# Patient Record
Sex: FEMALE | URBAN - METROPOLITAN AREA
[De-identification: names, ages, dates, MRNs, and addresses within clinical notes are randomized per-mention and may not be internally consistent; named-entity substitution may affect disease eponyms.]

---

## 2019-06-03 ENCOUNTER — APPOINTMENT (RX ONLY)
Dept: URBAN - METROPOLITAN AREA CLINIC 352 | Facility: CLINIC | Age: 79
Setting detail: DERMATOLOGY
End: 2019-06-03

## 2019-06-03 DIAGNOSIS — L72.0 EPIDERMAL CYST: ICD-10-CM

## 2019-06-03 PROBLEM — I48.91 UNSPECIFIED ATRIAL FIBRILLATION: Status: ACTIVE | Noted: 2019-06-03

## 2019-06-03 PROBLEM — M12.9 ARTHROPATHY, UNSPECIFIED: Status: ACTIVE | Noted: 2019-06-03

## 2019-06-03 PROBLEM — I10 ESSENTIAL (PRIMARY) HYPERTENSION: Status: ACTIVE | Noted: 2019-06-03

## 2019-06-03 PROBLEM — R23.3 SPONTANEOUS ECCHYMOSES: Status: ACTIVE | Noted: 2019-06-03

## 2019-06-03 PROBLEM — E78.5 HYPERLIPIDEMIA, UNSPECIFIED: Status: ACTIVE | Noted: 2019-06-03

## 2019-06-03 PROBLEM — E03.9 HYPOTHYROIDISM, UNSPECIFIED: Status: ACTIVE | Noted: 2019-06-03

## 2019-06-03 PROCEDURE — ? INCISION AND DRAINAGE

## 2019-06-03 PROCEDURE — ? COUNSELING

## 2019-06-03 PROCEDURE — 10060 I&D ABSCESS SIMPLE/SINGLE: CPT

## 2019-06-03 ASSESSMENT — LOCATION ZONE DERM: LOCATION ZONE: FACE

## 2019-06-03 ASSESSMENT — LOCATION DETAILED DESCRIPTION DERM: LOCATION DETAILED: LEFT CENTRAL MALAR CHEEK

## 2019-06-03 ASSESSMENT — LOCATION SIMPLE DESCRIPTION DERM: LOCATION SIMPLE: LEFT CHEEK

## 2019-06-03 NOTE — PROCEDURE: INCISION AND DRAINAGE
Detail Level: Detailed
Render Postcare In Note?: No
Lesion Type: Abscess
Epidermal Sutures: 4-0 Ethilon
Post-Care Instructions: I reviewed with the patient in detail post-care instructions. Patient should keep wound covered and call the office should any redness, pain, swelling or worsening occur.
Preparation Text: The area was prepped in the usual clean fashion.
Method: 15 blade
Consent was obtained and risks were reviewed including but not limited to delayed wound healing, infection, need for multiple I and D's, and pain.
Epidermal Closure: simple interrupted
Wound Care: Petrolatum
Suture Text: The incision was partially closed with
Size Of Lesion In Cm (Optional But May Be Required For Some Insurances): 0
Anesthesia Type: 1% lidocaine with epinephrine
Dressing: dry sterile dressing
Curette Text (Optional): After the contents were expressed a curette was used to partially remove the cyst wall.

## 2019-10-18 ENCOUNTER — APPOINTMENT (RX ONLY)
Dept: URBAN - METROPOLITAN AREA CLINIC 352 | Facility: CLINIC | Age: 79
Setting detail: DERMATOLOGY
End: 2019-10-18

## 2019-10-18 DIAGNOSIS — L57.0 ACTINIC KERATOSIS: ICD-10-CM

## 2019-10-18 PROCEDURE — ? COUNSELING

## 2019-10-18 PROCEDURE — 17000 DESTRUCT PREMALG LESION: CPT

## 2019-10-18 PROCEDURE — ? LIQUID NITROGEN (CM)

## 2019-10-18 ASSESSMENT — LOCATION ZONE DERM
LOCATION ZONE: EAR
LOCATION ZONE: EAR

## 2019-10-18 ASSESSMENT — LOCATION DETAILED DESCRIPTION DERM
LOCATION DETAILED: LEFT SUPERIOR CRUS OF ANTIHELIX
LOCATION DETAILED: LEFT SUPERIOR CRUS OF ANTIHELIX

## 2019-10-18 ASSESSMENT — LOCATION SIMPLE DESCRIPTION DERM
LOCATION SIMPLE: LEFT EAR
LOCATION SIMPLE: LEFT EAR

## 2019-11-18 ENCOUNTER — APPOINTMENT (RX ONLY)
Dept: URBAN - METROPOLITAN AREA CLINIC 352 | Facility: CLINIC | Age: 79
Setting detail: DERMATOLOGY
End: 2019-11-18

## 2019-11-18 PROBLEM — D48.5 NEOPLASM OF UNCERTAIN BEHAVIOR OF SKIN: Status: ACTIVE | Noted: 2019-11-18

## 2019-11-18 PROCEDURE — ? BIOPSY BY SHAVE METHOD

## 2019-11-18 PROCEDURE — 69100 BIOPSY OF EXTERNAL EAR: CPT

## 2019-12-09 ENCOUNTER — APPOINTMENT (RX ONLY)
Dept: URBAN - METROPOLITAN AREA CLINIC 84 | Facility: CLINIC | Age: 79
Setting detail: DERMATOLOGY
End: 2019-12-09

## 2019-12-09 VITALS — HEART RATE: 66 BPM | DIASTOLIC BLOOD PRESSURE: 78 MMHG | SYSTOLIC BLOOD PRESSURE: 134 MMHG

## 2019-12-09 PROBLEM — C44.229 SQUAMOUS CELL CARCINOMA OF SKIN OF LEFT EAR AND EXTERNAL AURICULAR CANAL: Status: ACTIVE | Noted: 2019-12-09

## 2019-12-09 PROCEDURE — ? CONSULTATION FOR SURGICAL REPAIR

## 2019-12-09 PROCEDURE — 17311 MOHS 1 STAGE H/N/HF/G: CPT

## 2019-12-09 PROCEDURE — 15260 FTH/GFT FR N/E/E/L 20 SQCM/<: CPT

## 2019-12-09 PROCEDURE — ? MOHS SURGERY

## 2019-12-09 PROCEDURE — ? REPAIR NOTE

## 2019-12-09 NOTE — PROCEDURE: CONSULTATION FOR SURGICAL REPAIR
Size Of Defect: 1
Repair Selected By Patient (Optional): Second intention
Referring Provider (Optional): 
Detail Level: Detailed
Date Scheduled For Repair (Optional): follow up in 3 weeks
X Size Of Defect In Cm (Optional): 0.9

## 2019-12-09 NOTE — PROCEDURE: MOHS SURGERY
Referred To Plastics For Closure Text (Leave Blank If You Do Not Want): After obtaining clear surgical margins the patient was sent to plastics for surgical repair.  The patient understands they will receive post-surgical care and follow-up from Dr. Pink.

## 2019-12-09 NOTE — PROCEDURE: MOHS SURGERY
Cataracts    CARE AFTER SMALL INCISION TECHNIQUE       Dr. Mansfield, Dr. Gibson,  Dr. Garcia and Dr. Matamoros perform state-of-the-art cataract surgery.  In most cases, you will have the new small incision technique performed.  However, because of technical factors and the anatomy of your eye, sometimes it is better to do the more traditional extracapsular technique.  Your doctor can explain this technique to your.  The routine before surgery is the same for both techniques.   However, your instructions and restrictions may be slightly different after surgery.      · You may sleep on either side.  (Remember to wear your eye shield while you sleep for 1 week.)    · Do not swim for 1 week.    · You may resume all other normal activities immediately, or as soon as you feel able.    · Yes!  You can lift anything, bend, bowl, golf, jog, houseclean, mow the lawn, read and watch TV.    EYE DROPS:    Follow your physician's instructions regarding your post-operative eye drops.    Other Instructions:  Bring your eye drops to all your appointments.    If you have any questions or concerns, your doctor would prefer you call rather than worry needlessly.      We are available 24 hours a day by calling [phone].    [address], [address]  [place] [address] [phone]         What to Expect After Cataract Surgery    The following instructions are designed to help prevent injury to your eye after surgery, to help decrease pressure in your eye, and to prevent infection or complications.    Care of your operated eye  · Do not rub your eye; gently wipe the eyelid with a clean tissue.  · Tape your metal shield over your eye whenever you sleep or nap.  Your doctor will tell you when you no longer need it.  · Always wash your hands before and after putting medications into your eye and when applying the metal shield.  · You will be instructed about your eye medications by the nurse in [facility] or by  your doctor the following day in his office.  Take your eye medications and all supplies (if given) with you when you go to the doctor's office tomorrow.    What to expect after eye surgery  · You may have a sticking or scratching sensation in your eye for several days.  · Mild pain is sometimes present for 24 hours. (Plain Tylenol will usually relieve this pain.)  · Double vision is common in the first 24 hours.  · Vision may be blurry for several days to weeks.  This will clear with healing and your new glasses.  · Moderate to severe redness is common; this will disappear.  · Occasionally, the lower eye lid may be black and blue.  · Mild crusting on the eye lids may occur for one to seven days.  · Your facial muscles may feel weak and numb for 24 hours after surgery.  · You may notice some floaters (black spots in vision).  · Mild eye tearing is common for several weeks after surgery.  · You will be fitted with new eyeglasses seven to eight weeks after surgery.    Call your doctor if any of the following occur:  · Severe pain in the operated eye.  · Large amounts of continuous pus or mucous draining from the operated eye.  A little discharge in the morning is normal.  · Sudden decrease in vision or development of shadows or curtains over vision.    If you have any questions or concerns, your doctor would prefer you call the office rather than worry needlessly.     Care After Anesthesia or Sedation    After Discharge  · Due to the medicine given, someone must drive you home.  If possible, have someone stay with you at home the day of discharge and the night after surgery.  · If you have infants or small children at home, please have someone help you for at least 24 hours after your surgery.  · Do not drive for at least 24 hours after surgery (or as told by your doctor).  · Rest for the remainder of the day. Go up and down stairs slowly.  · Do not smoke after surgery.  Smoking can delay healing.  · Do not operate  heavy or potential harmful equipment.  · Do not make legally binding decisions.  · Do no drink alcohol for 24 hours.    Diet  · Nausea may be expected for the first 24 to 48 hours.  Start eating a bland diet (toast, gelatin, 7-up, hot cereal, crackers, sherbet, broth, soup).  · Drink plenty of fluids (6 to 8 glasses of water).  · Resume your regular diet as able.  · Avoid greasy or spicy foods for 24 hours.    Urination  · The effects of anesthetics may cause some people to have trouble passing urine the day of surgery.  Drink a lot of fluids to help prevent this.  · Try to urinate within 12 hours of surgery.  · If you are unable to pass urine and feel like you need to, call your doctor or the hospital.    Pain Control  · If your incision was injected with a long acting local anesthetic, it will wear off in 4 to 6 hours.  You can expect to have some pain at this time.  · Treat your pain with the prescribed pain medicine before it wears off.  Do not wait until your pain becomes severe.    · Ask your nurse when you had your last pain medicine, so you know when you can take another one after you get home.    · Your last pain medicine was given at [facility]IV[facility]    Call your doctor if you have:    · Nausea and vomiting that does not stop  · Fever over 101 degrees F.  · Pain not relieved by pain medication  · If you feel you have to pass urine and you are not able to do so.  · Unusual changes in behavior  · Dizziness  · Hives     If you are not able to reach your doctor, you may call the emergency department.     Graft Cartilage Fenestration Text: The cartilage was fenestrated with a 2mm punch biopsy to help facilitate graft survival and healing.

## 2019-12-09 NOTE — PROCEDURE: MOHS SURGERY
Referred To Mid-Level For Closure Text (Leave Blank If You Do Not Want): After obtaining clear surgical margins the patient was sent to Mundo Pierre PA-C for surgical repair.  The patient understands they will receive post-surgical care and follow-up from the mid-level provider.

## 2019-12-16 ENCOUNTER — APPOINTMENT (RX ONLY)
Dept: URBAN - METROPOLITAN AREA CLINIC 84 | Facility: CLINIC | Age: 79
Setting detail: DERMATOLOGY
End: 2019-12-16

## 2019-12-16 DIAGNOSIS — Z48.817 ENCOUNTER FOR SURGICAL AFTERCARE FOLLOWING SURGERY ON THE SKIN AND SUBCUTANEOUS TISSUE: ICD-10-CM

## 2019-12-16 PROBLEM — C44.92 SQUAMOUS CELL CARCINOMA OF SKIN, UNSPECIFIED: Status: ACTIVE | Noted: 2019-12-16

## 2019-12-16 PROCEDURE — ? CONSULTATION FOR SURGICAL REPAIR

## 2019-12-16 PROCEDURE — 99212 OFFICE O/P EST SF 10 MIN: CPT | Mod: 24

## 2019-12-16 PROCEDURE — ? POST-OP WOUND EVALUATION

## 2019-12-16 PROCEDURE — 99024 POSTOP FOLLOW-UP VISIT: CPT

## 2019-12-16 ASSESSMENT — LOCATION SIMPLE DESCRIPTION DERM: LOCATION SIMPLE: LEFT EAR

## 2019-12-16 ASSESSMENT — LOCATION DETAILED DESCRIPTION DERM: LOCATION DETAILED: LEFT INFERIOR CRUS OF ANTIHELIX

## 2019-12-16 ASSESSMENT — LOCATION ZONE DERM: LOCATION ZONE: EAR

## 2019-12-16 NOTE — PROCEDURE: POST-OP WOUND EVALUATION
Follow Up Units (Optional): 2
Wound Color?: pink
Follow Up Time Frame (Optional): weeks
Add 98112 Cpt? (Important Note: In 2017 The Use Of 38176 Is Being Tracked By Cms To Determine Future Global Period Reimbursement For Global Periods): yes
Wound Diameter In Cm(Optional): 0
Wound Crusting?: clean
Wound Evaluated By (Optional): [email or web address]
Wound Edema?: mild
Detail Level: Detailed

## 2019-12-16 NOTE — PROCEDURE: CONSULTATION FOR SURGICAL REPAIR
Size Of Defect: 1
Date Scheduled For Repair (Optional): follow up in 3 weeks
X Size Of Defect In Cm (Optional): 0.9
Repair Selected By Patient (Optional): Second intention
Referring Provider (Optional): 
Detail Level: Detailed

## 2022-04-20 ENCOUNTER — APPOINTMENT (RX ONLY)
Dept: URBAN - METROPOLITAN AREA CLINIC 78 | Facility: CLINIC | Age: 82
Setting detail: DERMATOLOGY
End: 2022-04-20

## 2022-04-20 DIAGNOSIS — L82.0 INFLAMED SEBORRHEIC KERATOSIS: ICD-10-CM

## 2022-04-20 DIAGNOSIS — D18.0 HEMANGIOMA: ICD-10-CM

## 2022-04-20 DIAGNOSIS — L82.1 OTHER SEBORRHEIC KERATOSIS: ICD-10-CM

## 2022-04-20 DIAGNOSIS — D22 MELANOCYTIC NEVI: ICD-10-CM

## 2022-04-20 DIAGNOSIS — Z12.83 ENCOUNTER FOR SCREENING FOR MALIGNANT NEOPLASM OF SKIN: ICD-10-CM

## 2022-04-20 DIAGNOSIS — L57.8 OTHER SKIN CHANGES DUE TO CHRONIC EXPOSURE TO NONIONIZING RADIATION: ICD-10-CM

## 2022-04-20 DIAGNOSIS — L81.4 OTHER MELANIN HYPERPIGMENTATION: ICD-10-CM

## 2022-04-20 PROBLEM — D22.5 MELANOCYTIC NEVI OF TRUNK: Status: ACTIVE | Noted: 2022-04-20

## 2022-04-20 PROBLEM — D18.01 HEMANGIOMA OF SKIN AND SUBCUTANEOUS TISSUE: Status: ACTIVE | Noted: 2022-04-20

## 2022-04-20 PROCEDURE — 99213 OFFICE O/P EST LOW 20 MIN: CPT | Mod: 25

## 2022-04-20 PROCEDURE — ? ADDITIONAL NOTES

## 2022-04-20 PROCEDURE — ? COUNSELING

## 2022-04-20 PROCEDURE — ? TREATMENT REGIMEN

## 2022-04-20 PROCEDURE — ? LIQUID NITROGEN

## 2022-04-20 PROCEDURE — ? FULL BODY SKIN EXAM

## 2022-04-20 PROCEDURE — 17110 DESTRUCTION B9 LES UP TO 14: CPT

## 2022-04-20 ASSESSMENT — LOCATION ZONE DERM
LOCATION ZONE: NECK
LOCATION ZONE: TRUNK
LOCATION ZONE: FACE

## 2022-04-20 ASSESSMENT — LOCATION SIMPLE DESCRIPTION DERM
LOCATION SIMPLE: LEFT TEMPLE
LOCATION SIMPLE: LEFT LOWER BACK
LOCATION SIMPLE: LEFT UPPER BACK
LOCATION SIMPLE: UPPER BACK
LOCATION SIMPLE: RIGHT ANTERIOR NECK

## 2022-04-20 ASSESSMENT — LOCATION DETAILED DESCRIPTION DERM
LOCATION DETAILED: RIGHT INFERIOR LATERAL NECK
LOCATION DETAILED: LEFT MID-UPPER BACK
LOCATION DETAILED: LEFT SUPERIOR MEDIAL MIDBACK
LOCATION DETAILED: LEFT INFERIOR TEMPLE
LOCATION DETAILED: LEFT INFERIOR UPPER BACK
LOCATION DETAILED: INFERIOR THORACIC SPINE
LOCATION DETAILED: RIGHT INFERIOR ANTERIOR NECK

## 2022-04-20 NOTE — PROCEDURE: LIQUID NITROGEN
Medical Necessity Information: It is in your best interest to select a reason for this procedure from the list below. All of these items fulfill various CMS LCD requirements except the new and changing color options.
Add 52 Modifier (Optional): no
Consent: The patient's consent was obtained including but not limited to risks of crusting, scabbing, blistering, scarring, darker or lighter pigmentary change, recurrence, incomplete removal and infection.
Show Topical Anesthesia Variable?: Yes
Spray Paint Text: The liquid nitrogen was applied to the skin utilizing a spray paint frosting technique.
Post-Care Instructions: I reviewed with the patient in detail post-care instructions. Patient is to wear sunprotection, and avoid picking at any of the treated lesions. Pt may apply Vaseline to crusted or scabbing areas.
Medical Necessity Clause: This procedure was medically necessary because the lesions that were treated were:
Detail Level: Detailed

## 2023-12-22 NOTE — PROCEDURE: COUNSELING
The patient presented for insulin injection teaching.  The patient was able to demonstrate without assistance the use of the insulin pen, where to inject and what time to inject the insulin prior to leaving the office.  Hypoglycemia interventions were reviewed with the patient.  The patient and SO verbalized understanding of all prior to leaving the office. Provider present in the clinic.     
Detail Level: Generalized
Detail Level: Detailed
Detail Level: Zone